# Patient Record
Sex: MALE | Race: WHITE | Employment: FULL TIME | URBAN - METROPOLITAN AREA
[De-identification: names, ages, dates, MRNs, and addresses within clinical notes are randomized per-mention and may not be internally consistent; named-entity substitution may affect disease eponyms.]

---

## 2019-09-04 ENCOUNTER — HOSPITAL ENCOUNTER (OUTPATIENT)
Age: 34
Discharge: HOME | End: 2019-09-04
Attending: EMERGENCY MEDICINE
Payer: COMMERCIAL

## 2019-09-04 VITALS
RESPIRATION RATE: 20 BRPM | HEIGHT: 70 IN | OXYGEN SATURATION: 98 % | TEMPERATURE: 99 F | HEART RATE: 74 BPM | DIASTOLIC BLOOD PRESSURE: 59 MMHG | BODY MASS INDEX: 26.63 KG/M2 | WEIGHT: 186 LBS | SYSTOLIC BLOOD PRESSURE: 112 MMHG

## 2019-09-04 DIAGNOSIS — B34.9 VIRAL SYNDROME: Primary | ICD-10-CM

## 2019-09-04 DIAGNOSIS — R55 SYNCOPE, VASOVAGAL: ICD-10-CM

## 2019-09-04 LAB
#MXD IC: 1.1 X10ˆ3/UL (ref 0.1–1)
CREAT BLD-MCNC: 1.1 MG/DL (ref 0.7–1.3)
GLUCOSE BLD-MCNC: 105 MG/DL (ref 70–99)
HCT VFR BLD AUTO: 40.8 % (ref 39–53)
HGB BLD-MCNC: 13.9 G/DL (ref 13–17.5)
ISTAT BUN: 11 MG/DL (ref 8–20)
ISTAT CHLORIDE: 102 MMOL/L (ref 101–111)
ISTAT HEMATOCRIT: 43 % (ref 37–53)
ISTAT IONIZED CALCIUM FOR CHEM 8: 1.16 MMOL/L (ref 1.12–1.32)
ISTAT POTASSIUM: 3.3 MMOL/L (ref 3.6–5.1)
ISTAT SODIUM: 140 MMOL/L (ref 136–145)
ISTAT TCO2: 24 MMOL/L (ref 22–32)
LYMPHOCYTES # BLD AUTO: 1.1 X10ˆ3/UL (ref 1–4)
LYMPHOCYTES NFR BLD AUTO: 11.5 %
MCH RBC QN AUTO: 29.6 PG (ref 26–34)
MCHC RBC AUTO-ENTMCNC: 34.1 G/DL (ref 31–37)
MCV RBC AUTO: 87 FL (ref 80–100)
MIXED CELL %: 11.9 %
NEUTROPHILS # BLD AUTO: 7.1 X10ˆ3/UL (ref 1.5–7.7)
NEUTROPHILS NFR BLD AUTO: 76.6 %
PLATELET # BLD AUTO: 161 X10ˆ3/UL (ref 150–450)
RBC # BLD AUTO: 4.69 X10ˆ6/UL (ref 4.3–5.7)
S PYO AG THROAT QL: NEGATIVE
WBC # BLD AUTO: 9.3 X10ˆ3/UL (ref 4–11)

## 2019-09-04 PROCEDURE — 80047 BASIC METABLC PNL IONIZED CA: CPT

## 2019-09-04 PROCEDURE — 85025 COMPLETE CBC W/AUTO DIFF WBC: CPT | Performed by: EMERGENCY MEDICINE

## 2019-09-04 PROCEDURE — 96360 HYDRATION IV INFUSION INIT: CPT

## 2019-09-04 PROCEDURE — 87430 STREP A AG IA: CPT

## 2019-09-04 PROCEDURE — 99204 OFFICE O/P NEW MOD 45 MIN: CPT

## 2019-09-04 RX ORDER — 0.9 % SODIUM CHLORIDE 0.9 %
1000 INTRAVENOUS SOLUTION INTRAVENOUS ONCE
Status: COMPLETED | OUTPATIENT
Start: 2019-09-04 | End: 2019-09-04

## 2019-09-04 NOTE — ED INITIAL ASSESSMENT (HPI)
Sore throat swollen glands, fever of 104 yesterday, states he fainted this morning as he was going to shower. Woke up diaphoretic.

## 2019-09-04 NOTE — ED PROVIDER NOTES
Patient Seen in: [facility] [place]    History   Patient presents with:  Sore Throat  Fever (infectious)    Stated Complaint: sore throat     HPI    31 yo male with sore throat and fever that started yesterday.  Temp as high as 1 cervical adenopathy. Neurological: He is alert. No sensory deficit. He exhibits normal muscle tone. Skin: Skin is warm and dry. Capillary refill takes less than 2 seconds. Psychiatric: He has a normal mood and affect.  His behavior is normal.   [employer]

## 2020-08-27 PROBLEM — Z00.00 ROUTINE GENERAL MEDICAL EXAMINATION AT A HEALTH CARE FACILITY: Status: ACTIVE | Noted: 2020-08-27
